# Patient Record
Sex: MALE | Race: WHITE | NOT HISPANIC OR LATINO | URBAN - METROPOLITAN AREA
[De-identification: names, ages, dates, MRNs, and addresses within clinical notes are randomized per-mention and may not be internally consistent; named-entity substitution may affect disease eponyms.]

---

## 2021-04-16 ENCOUNTER — OFFICE VISIT (OUTPATIENT)
Dept: HEPATOLOGY | Facility: CLINIC | Age: 31
End: 2021-04-16
Payer: COMMERCIAL

## 2021-04-16 ENCOUNTER — LAB VISIT (OUTPATIENT)
Dept: LAB | Facility: HOSPITAL | Age: 31
End: 2021-04-16
Payer: COMMERCIAL

## 2021-04-16 ENCOUNTER — PROCEDURE VISIT (OUTPATIENT)
Dept: HEPATOLOGY | Facility: CLINIC | Age: 31
End: 2021-04-16
Payer: COMMERCIAL

## 2021-04-16 ENCOUNTER — PATIENT MESSAGE (OUTPATIENT)
Dept: HEPATOLOGY | Facility: CLINIC | Age: 31
End: 2021-04-16

## 2021-04-16 VITALS
BODY MASS INDEX: 28 KG/M2 | HEIGHT: 70 IN | OXYGEN SATURATION: 99 % | DIASTOLIC BLOOD PRESSURE: 82 MMHG | WEIGHT: 195.56 LBS | RESPIRATION RATE: 16 BRPM | HEART RATE: 72 BPM | SYSTOLIC BLOOD PRESSURE: 142 MMHG

## 2021-04-16 DIAGNOSIS — R79.89 ELEVATED LIVER FUNCTION TESTS: ICD-10-CM

## 2021-04-16 DIAGNOSIS — K76.0 FATTY LIVER: Primary | ICD-10-CM

## 2021-04-16 DIAGNOSIS — K76.0 FATTY LIVER: ICD-10-CM

## 2021-04-16 DIAGNOSIS — K74.01 EARLY HEPATIC FIBROSIS: ICD-10-CM

## 2021-04-16 LAB
ALBUMIN SERPL BCP-MCNC: 3.9 G/DL (ref 3.5–5.2)
ALP SERPL-CCNC: 122 U/L (ref 55–135)
ALT SERPL W/O P-5'-P-CCNC: 75 U/L (ref 10–44)
AST SERPL-CCNC: 28 U/L (ref 10–40)
BILIRUB DIRECT SERPL-MCNC: <0.1 MG/DL (ref 0.1–0.3)
BILIRUB SERPL-MCNC: 0.6 MG/DL (ref 0.1–1)
HEPATITIS A ANTIBODY, IGG: NEGATIVE
PROT SERPL-MCNC: 7.4 G/DL (ref 6–8.4)

## 2021-04-16 PROCEDURE — 91200 FIBROSCAN (VIBRATION CONTROLLED TRANSIENT ELASTOGRAPHY): ICD-10-PCS | Mod: S$GLB,,, | Performed by: NURSE PRACTITIONER

## 2021-04-16 PROCEDURE — 86706 HEP B SURFACE ANTIBODY: CPT | Performed by: NURSE PRACTITIONER

## 2021-04-16 PROCEDURE — 99999 PR PBB SHADOW E&M-NEW PATIENT-LVL III: ICD-10-PCS | Mod: PBBFAC,,, | Performed by: NURSE PRACTITIONER

## 2021-04-16 PROCEDURE — 99204 PR OFFICE/OUTPT VISIT, NEW, LEVL IV, 45-59 MIN: ICD-10-PCS | Mod: S$GLB,,, | Performed by: NURSE PRACTITIONER

## 2021-04-16 PROCEDURE — 91200 LIVER ELASTOGRAPHY: CPT | Mod: S$GLB,,, | Performed by: NURSE PRACTITIONER

## 2021-04-16 PROCEDURE — 36415 COLL VENOUS BLD VENIPUNCTURE: CPT | Performed by: NURSE PRACTITIONER

## 2021-04-16 PROCEDURE — 1126F AMNT PAIN NOTED NONE PRSNT: CPT | Mod: S$GLB,,, | Performed by: NURSE PRACTITIONER

## 2021-04-16 PROCEDURE — 86790 VIRUS ANTIBODY NOS: CPT | Performed by: NURSE PRACTITIONER

## 2021-04-16 PROCEDURE — 99204 OFFICE O/P NEW MOD 45 MIN: CPT | Mod: S$GLB,,, | Performed by: NURSE PRACTITIONER

## 2021-04-16 PROCEDURE — 3008F PR BODY MASS INDEX (BMI) DOCUMENTED: ICD-10-PCS | Mod: CPTII,S$GLB,, | Performed by: NURSE PRACTITIONER

## 2021-04-16 PROCEDURE — 80076 HEPATIC FUNCTION PANEL: CPT | Performed by: NURSE PRACTITIONER

## 2021-04-16 PROCEDURE — 3008F BODY MASS INDEX DOCD: CPT | Mod: CPTII,S$GLB,, | Performed by: NURSE PRACTITIONER

## 2021-04-16 PROCEDURE — 1126F PR PAIN SEVERITY QUANTIFIED, NO PAIN PRESENT: ICD-10-PCS | Mod: S$GLB,,, | Performed by: NURSE PRACTITIONER

## 2021-04-16 PROCEDURE — 99999 PR PBB SHADOW E&M-NEW PATIENT-LVL III: CPT | Mod: PBBFAC,,, | Performed by: NURSE PRACTITIONER

## 2021-04-16 RX ORDER — FENOFIBRATE 160 MG/1
160 TABLET ORAL DAILY
COMMUNITY
Start: 2021-02-24 | End: 2022-06-03

## 2021-04-19 LAB
HBV SURFACE AB SER QL IA: NEGATIVE
HBV SURFACE AB SERPL IA-ACNC: <3 MIU/ML

## 2021-07-21 ENCOUNTER — PATIENT MESSAGE (OUTPATIENT)
Dept: HEPATOLOGY | Facility: CLINIC | Age: 31
End: 2021-07-21

## 2021-11-05 ENCOUNTER — PATIENT MESSAGE (OUTPATIENT)
Dept: HEPATOLOGY | Facility: CLINIC | Age: 31
End: 2021-11-05
Payer: COMMERCIAL

## 2021-11-09 ENCOUNTER — OFFICE VISIT (OUTPATIENT)
Dept: HEPATOLOGY | Facility: CLINIC | Age: 31
End: 2021-11-09
Payer: COMMERCIAL

## 2021-11-09 DIAGNOSIS — R53.1 GENERALIZED WEAKNESS: ICD-10-CM

## 2021-11-09 DIAGNOSIS — K74.01 EARLY HEPATIC FIBROSIS: ICD-10-CM

## 2021-11-09 DIAGNOSIS — R79.89 ELEVATED LIVER FUNCTION TESTS: ICD-10-CM

## 2021-11-09 DIAGNOSIS — R10.11 RIGHT UPPER QUADRANT PAIN: ICD-10-CM

## 2021-11-09 DIAGNOSIS — K76.0 FATTY LIVER: Primary | ICD-10-CM

## 2021-11-09 DIAGNOSIS — R06.02 SHORTNESS OF BREATH: ICD-10-CM

## 2021-11-09 DIAGNOSIS — R55 SYNCOPE, UNSPECIFIED SYNCOPE TYPE: ICD-10-CM

## 2021-11-09 DIAGNOSIS — R53.83 FATIGUE, UNSPECIFIED TYPE: ICD-10-CM

## 2021-11-09 DIAGNOSIS — R68.89 COLD INTOLERANCE: ICD-10-CM

## 2021-11-09 PROCEDURE — 99214 PR OFFICE/OUTPT VISIT, EST, LEVL IV, 30-39 MIN: ICD-10-PCS | Mod: 95,,, | Performed by: NURSE PRACTITIONER

## 2021-11-09 PROCEDURE — 1159F PR MEDICATION LIST DOCUMENTED IN MEDICAL RECORD: ICD-10-PCS | Mod: CPTII,95,, | Performed by: NURSE PRACTITIONER

## 2021-11-09 PROCEDURE — 1160F RVW MEDS BY RX/DR IN RCRD: CPT | Mod: CPTII,95,, | Performed by: NURSE PRACTITIONER

## 2021-11-09 PROCEDURE — 1159F MED LIST DOCD IN RCRD: CPT | Mod: CPTII,95,, | Performed by: NURSE PRACTITIONER

## 2021-11-09 PROCEDURE — 99214 OFFICE O/P EST MOD 30 MIN: CPT | Mod: 95,,, | Performed by: NURSE PRACTITIONER

## 2021-11-09 PROCEDURE — 1160F PR REVIEW ALL MEDS BY PRESCRIBER/CLIN PHARMACIST DOCUMENTED: ICD-10-PCS | Mod: CPTII,95,, | Performed by: NURSE PRACTITIONER

## 2021-11-11 ENCOUNTER — PATIENT MESSAGE (OUTPATIENT)
Dept: HEPATOLOGY | Facility: CLINIC | Age: 31
End: 2021-11-11
Payer: COMMERCIAL

## 2021-11-11 ENCOUNTER — TELEPHONE (OUTPATIENT)
Dept: HEPATOLOGY | Facility: CLINIC | Age: 31
End: 2021-11-11
Payer: COMMERCIAL

## 2021-11-24 ENCOUNTER — PATIENT MESSAGE (OUTPATIENT)
Dept: HEPATOLOGY | Facility: CLINIC | Age: 31
End: 2021-11-24
Payer: COMMERCIAL

## 2021-11-24 ENCOUNTER — TELEPHONE (OUTPATIENT)
Dept: HEPATOLOGY | Facility: CLINIC | Age: 31
End: 2021-11-24
Payer: COMMERCIAL

## 2021-11-24 DIAGNOSIS — R55 SYNCOPE, UNSPECIFIED SYNCOPE TYPE: ICD-10-CM

## 2021-11-24 DIAGNOSIS — R10.11 RIGHT UPPER QUADRANT PAIN: Primary | ICD-10-CM

## 2021-11-24 DIAGNOSIS — R53.83 FATIGUE, UNSPECIFIED TYPE: ICD-10-CM

## 2021-11-24 DIAGNOSIS — K76.0 FATTY LIVER: ICD-10-CM

## 2021-11-24 DIAGNOSIS — R06.02 SHORTNESS OF BREATH: ICD-10-CM

## 2021-11-24 DIAGNOSIS — K74.01 EARLY HEPATIC FIBROSIS: ICD-10-CM

## 2021-11-24 DIAGNOSIS — R53.1 GENERALIZED WEAKNESS: ICD-10-CM

## 2021-11-29 ENCOUNTER — HOSPITAL ENCOUNTER (OUTPATIENT)
Dept: RADIOLOGY | Facility: HOSPITAL | Age: 31
Discharge: HOME | End: 2021-11-29
Attending: NURSE PRACTITIONER
Payer: COMMERCIAL

## 2021-11-29 ENCOUNTER — HOSPITAL ENCOUNTER (OUTPATIENT)
Dept: CARDIOLOGY | Facility: HOSPITAL | Age: 31
Discharge: HOME | End: 2021-11-29
Attending: NURSE PRACTITIONER
Payer: COMMERCIAL

## 2021-11-29 VITALS
WEIGHT: 195 LBS | SYSTOLIC BLOOD PRESSURE: 142 MMHG | BODY MASS INDEX: 27.92 KG/M2 | DIASTOLIC BLOOD PRESSURE: 82 MMHG | HEIGHT: 70 IN

## 2021-11-29 DIAGNOSIS — R55 SYNCOPE, UNSPECIFIED SYNCOPE TYPE: ICD-10-CM

## 2021-11-29 DIAGNOSIS — R06.02 SHORTNESS OF BREATH: ICD-10-CM

## 2021-11-29 DIAGNOSIS — K76.0 FATTY LIVER: ICD-10-CM

## 2021-11-29 DIAGNOSIS — K74.01 EARLY HEPATIC FIBROSIS: ICD-10-CM

## 2021-11-29 DIAGNOSIS — R53.83 FATIGUE, UNSPECIFIED TYPE: ICD-10-CM

## 2021-11-29 DIAGNOSIS — R10.11 RIGHT UPPER QUADRANT PAIN: ICD-10-CM

## 2021-11-29 DIAGNOSIS — R53.1 GENERALIZED WEAKNESS: ICD-10-CM

## 2021-11-29 DIAGNOSIS — R79.89 ELEVATED LIVER FUNCTION TESTS: ICD-10-CM

## 2021-11-29 LAB
AORTIC ROOT ANNULUS: 2.84 CM
AV INDEX (PROSTH): 0.79
AV MEAN GRADIENT: 7 MMHG
AV PEAK GRADIENT: 13 MMHG
AV REGURGITATION PRESSURE HALF TIME: 1219.01 MS
AV VALVE AREA: 2.46 CM2
AV VELOCITY RATIO: 0.77
BSA FOR ECHO PROCEDURE: 2.09 M2
CV ECHO LV RWT: 0.35 CM
DOP CALC AO PEAK VEL: 1.81 M/S
DOP CALC AO VTI: 38.1 CM
DOP CALC LVOT AREA: 3.1 CM2
DOP CALC LVOT DIAMETER: 1.99 CM
DOP CALC LVOT PEAK VEL: 1.39 M/S
DOP CALC LVOT STROKE VOLUME: 93.88 CM3
DOP CALC RVOT PEAK VEL: 0.83 M/S
DOP CALC RVOT VTI: 18.6 CM
DOP CALCLVOT PEAK VEL VTI: 30.2 CM
E WAVE DECELERATION TIME: 159.86 MSEC
E/A RATIO: 1.91
E/E' RATIO: 7.52 M/S
ECHO EF ESTIMATED: 55 %
ECHO LV POSTERIOR WALL: 0.78 CM (ref 0.6–1.1)
EJECTION FRACTION: 60 %
FRACTIONAL SHORTENING: 29 % (ref 28–44)
INTERVENTRICULAR SEPTUM: 1.1 CM (ref 0.6–1.1)
IVRT: 97.05 MSEC
LA MAJOR: 4.59 CM
LA MINOR: 4.34 CM
LA WIDTH: 3.51 CM
LEFT ATRIUM SIZE: 3.07 CM
LEFT ATRIUM VOLUME INDEX MOD: 24.1 ML/M2
LEFT ATRIUM VOLUME INDEX: 19.8 ML/M2
LEFT ATRIUM VOLUME MOD: 49.59 CM3
LEFT ATRIUM VOLUME: 40.86 CM3
LEFT INTERNAL DIMENSION IN SYSTOLE: 3.22 CM (ref 2.1–4)
LEFT VENTRICLE DIASTOLIC VOLUME INDEX: 45.17 ML/M2
LEFT VENTRICLE DIASTOLIC VOLUME: 93.04 ML
LEFT VENTRICLE MASS INDEX: 69 G/M2
LEFT VENTRICLE SYSTOLIC VOLUME INDEX: 20.2 ML/M2
LEFT VENTRICLE SYSTOLIC VOLUME: 41.64 ML
LEFT VENTRICULAR INTERNAL DIMENSION IN DIASTOLE: 4.51 CM (ref 3.5–6)
LEFT VENTRICULAR MASS: 141.36 G
LV LATERAL E/E' RATIO: 6.81 M/S
LV SEPTAL E/E' RATIO: 8.38 M/S
LVOT MG: 3.95 MMHG
LVOT MV: 0.92 CM/S
MV PEAK A VEL: 0.57 M/S
MV PEAK E VEL: 1.09 M/S
PISA AR MAX VEL: 3.3 M/S
PISA TR MAX VEL: 2.67 M/S
PULM VEIN S/D RATIO: 0.84
PV MEAN GRADIENT: 1.63 MMHG
PV MV: 0.87 M/S
PV PEAK D VEL: 0.57 M/S
PV PEAK S VEL: 0.48 M/S
PV PEAK VELOCITY: 1.25 CM/S
RA MAJOR: 4.59 CM
RA PRESSURE: 3 MMHG
RA WIDTH: 2.83 CM
RIGHT VENTRICULAR END-DIASTOLIC DIMENSION: 1.88 CM
SINUS: 2.71 CM
STJ: 2.36 CM
TDI LATERAL: 0.16 M/S
TDI SEPTAL: 0.13 M/S
TDI: 0.15 M/S
TR MAX PG: 29 MMHG
TRICUSPID ANNULAR PLANE SYSTOLIC EXCURSION: 2.24 CM
TV REST PULMONARY ARTERY PRESSURE: 32 MMHG

## 2021-11-29 PROCEDURE — 93306 TTE W/DOPPLER COMPLETE: CPT | Mod: 26,,, | Performed by: INTERNAL MEDICINE

## 2021-11-29 PROCEDURE — 74177 CT ABD & PELVIS W/CONTRAST: CPT | Mod: TC

## 2021-11-29 PROCEDURE — 74177 CT ABDOMEN PELVIS WITH CONTRAST: ICD-10-PCS | Mod: 26,,, | Performed by: RADIOLOGY

## 2021-11-29 PROCEDURE — 25500020 PHARM REV CODE 255: Performed by: NURSE PRACTITIONER

## 2021-11-29 PROCEDURE — 74177 CT ABD & PELVIS W/CONTRAST: CPT | Mod: 26,,, | Performed by: RADIOLOGY

## 2021-11-29 PROCEDURE — 71046 X-RAY EXAM CHEST 2 VIEWS: CPT | Mod: 26,,, | Performed by: RADIOLOGY

## 2021-11-29 PROCEDURE — 71046 XR CHEST PA AND LATERAL: ICD-10-PCS | Mod: 26,,, | Performed by: RADIOLOGY

## 2021-11-29 PROCEDURE — 93306 ECHO (CUPID ONLY): ICD-10-PCS | Mod: 26,,, | Performed by: INTERNAL MEDICINE

## 2021-11-29 PROCEDURE — 93306 TTE W/DOPPLER COMPLETE: CPT

## 2021-11-29 PROCEDURE — A9698 NON-RAD CONTRAST MATERIALNOC: HCPCS | Performed by: NURSE PRACTITIONER

## 2021-11-29 PROCEDURE — 71046 X-RAY EXAM CHEST 2 VIEWS: CPT | Mod: TC

## 2021-11-29 RX ADMIN — IOHEXOL 1000 ML: 12 SOLUTION ORAL at 03:11

## 2021-11-29 RX ADMIN — IOHEXOL 100 ML: 350 INJECTION, SOLUTION INTRAVENOUS at 04:11

## 2021-12-06 ENCOUNTER — PATIENT MESSAGE (OUTPATIENT)
Dept: HEPATOLOGY | Facility: CLINIC | Age: 31
End: 2021-12-06

## 2021-12-06 ENCOUNTER — OFFICE VISIT (OUTPATIENT)
Dept: HEPATOLOGY | Facility: CLINIC | Age: 31
End: 2021-12-06
Payer: COMMERCIAL

## 2021-12-06 DIAGNOSIS — G47.33 OSA (OBSTRUCTIVE SLEEP APNEA): ICD-10-CM

## 2021-12-06 DIAGNOSIS — K74.01 EARLY HEPATIC FIBROSIS: ICD-10-CM

## 2021-12-06 DIAGNOSIS — R74.01 ELEVATED ALANINE AMINOTRANSFERASE (ALT) LEVEL: ICD-10-CM

## 2021-12-06 DIAGNOSIS — K76.0 FATTY LIVER: Primary | ICD-10-CM

## 2021-12-06 PROCEDURE — 99214 PR OFFICE/OUTPT VISIT, EST, LEVL IV, 30-39 MIN: ICD-10-PCS | Mod: 95,,, | Performed by: NURSE PRACTITIONER

## 2021-12-06 PROCEDURE — 99214 OFFICE O/P EST MOD 30 MIN: CPT | Mod: 95,,, | Performed by: NURSE PRACTITIONER

## 2021-12-07 ENCOUNTER — PATIENT MESSAGE (OUTPATIENT)
Dept: HEPATOLOGY | Facility: CLINIC | Age: 31
End: 2021-12-07
Payer: COMMERCIAL

## 2021-12-07 ENCOUNTER — TELEPHONE (OUTPATIENT)
Dept: HEPATOLOGY | Facility: CLINIC | Age: 31
End: 2021-12-07
Payer: COMMERCIAL

## 2022-02-28 ENCOUNTER — TELEPHONE (OUTPATIENT)
Dept: PULMONOLOGY | Facility: CLINIC | Age: 32
End: 2022-02-28
Payer: COMMERCIAL

## 2022-02-28 NOTE — TELEPHONE ENCOUNTER
Left message on patient voicemail, informing him that I'm contacting him in regards to scheduling him a appointment with one of our providers. I also advised patient that if he wants to schedule appointment or if he has any questions or concerns, he may contact the office. Office number has been provided.

## 2022-05-04 ENCOUNTER — PATIENT MESSAGE (OUTPATIENT)
Dept: INFECTIOUS DISEASES | Facility: CLINIC | Age: 32
End: 2022-05-04
Payer: COMMERCIAL

## 2022-05-09 ENCOUNTER — RESEARCH ENCOUNTER (OUTPATIENT)
Dept: RESEARCH | Facility: HOSPITAL | Age: 32
End: 2022-05-09
Payer: COMMERCIAL

## 2022-05-09 ENCOUNTER — PATIENT MESSAGE (OUTPATIENT)
Dept: INFECTIOUS DISEASES | Facility: CLINIC | Age: 32
End: 2022-05-09
Payer: COMMERCIAL

## 2022-05-09 NOTE — PROGRESS NOTES
RESEARCH STUDY CONSENT ENCOUNTER  ORGAN TRANSPLANT  [facility] DON ZARATE    Study Title: Role of Tumor-Induced Immune Tolerance in the Patient Response to Locoregional Therapy: Implications in Assessment Risk of Hepatocellular Carcinoma Recurrence Following Liver Transplantation    IRB #: [id number].B    IRB Approval Date: 6/8/2016    : Cesar Mckeon MD  Sub-investigator: Esequiel Michael, PhD    Patient Number: to be assigned    Patient is scheduled for labs on Betty 3, 2022.     This study is an observational study to evaluate patients receiving transarterial chemoembolization (TACE) or transarterial radioembolization (TARE) therapy to define the link between tumor-elicited peripheral cell populations, and the risk of hepatocellular carcinoma (HCC) recurrence before orthotopic liver transplantation (OLT). [IRB 2016.131.B] Patient is being consented as a control for this study and participation in this study will end after specimen collection. This patient is NOT undergoing TACE or TARE, and DOES NOT have HCC.     Present for discussion: patient Adelso Marrero, consenter Rena Mares, witness Bibi Carey  Is LAR Consenting for Subject: NO    Prior to the Informed Consent (IC) being signed, or any protocol required testing, procedure, or intervention being performed, the following was done or discussed with Adelso Marrero:    Purpose of the Study, Qualifications to Participate: YES  Study Design, Schedule and Procedures: YES  Risks, Benefits, Alternative Treatments, Compensation and Costs: YES  Confidentiality and HIPAA Authorization for Release of Medical Records for the research trial/subject's right/study related injury: YES  Study related contact information: YES  Voluntary Participation and Withdrawal from the research trial at any time: YES  Patient has been offered the opportunity to ask questions regarding the study and all questions were answered satisfactorily: YES  Patient verbalizes  understanding of the study/procedures and agrees to participate: YES  CRC and PI contact information given to patient:YES  Verification the ICF was appropriately completed: YES  Signed copy given to patient: YES  Copy in patient's chart and original uploaded to [facility]: YES    Research staff present during consent: Bibi Ordaz      No study procedures (I.e. specimen collection) were performed before the informed consent was signed.     In accordance with the study protocol, Research Lab orders will be placed and specimens will be collected on Betty 3, 2022 in:  [facility] VNP: NO  [facility]TX: NO  [facility] IM: YES    Rena Mares  Admin Research- Liver Transplant

## 2022-05-16 DIAGNOSIS — Z00.6 RESEARCH STUDY PATIENT: Primary | ICD-10-CM

## 2022-06-03 ENCOUNTER — OFFICE VISIT (OUTPATIENT)
Dept: HEPATOLOGY | Facility: CLINIC | Age: 32
End: 2022-06-03
Payer: COMMERCIAL

## 2022-06-03 ENCOUNTER — LAB VISIT (OUTPATIENT)
Dept: LAB | Facility: HOSPITAL | Age: 32
End: 2022-06-03
Payer: COMMERCIAL

## 2022-06-03 ENCOUNTER — PATIENT MESSAGE (OUTPATIENT)
Dept: HEPATOLOGY | Facility: CLINIC | Age: 32
End: 2022-06-03

## 2022-06-03 ENCOUNTER — PROCEDURE VISIT (OUTPATIENT)
Dept: HEPATOLOGY | Facility: CLINIC | Age: 32
End: 2022-06-03
Payer: COMMERCIAL

## 2022-06-03 VITALS
RESPIRATION RATE: 18 BRPM | DIASTOLIC BLOOD PRESSURE: 89 MMHG | HEIGHT: 70 IN | WEIGHT: 201.5 LBS | TEMPERATURE: 98 F | SYSTOLIC BLOOD PRESSURE: 133 MMHG | BODY MASS INDEX: 28.85 KG/M2 | OXYGEN SATURATION: 98 % | HEART RATE: 67 BPM

## 2022-06-03 DIAGNOSIS — R74.01 ELEVATED ALANINE AMINOTRANSFERASE (ALT) LEVEL: ICD-10-CM

## 2022-06-03 DIAGNOSIS — K76.0 FATTY LIVER: Primary | ICD-10-CM

## 2022-06-03 DIAGNOSIS — K76.0 FATTY LIVER: ICD-10-CM

## 2022-06-03 DIAGNOSIS — K74.01 EARLY HEPATIC FIBROSIS: ICD-10-CM

## 2022-06-03 DIAGNOSIS — Z00.6 RESEARCH STUDY PATIENT: ICD-10-CM

## 2022-06-03 LAB
ALBUMIN SERPL BCP-MCNC: 3.8 G/DL (ref 3.5–5.2)
ALP SERPL-CCNC: 109 U/L (ref 55–135)
ALT SERPL W/O P-5'-P-CCNC: 95 U/L (ref 10–44)
AST SERPL-CCNC: 38 U/L (ref 10–40)
BILIRUB DIRECT SERPL-MCNC: <0.1 MG/DL (ref 0.1–0.3)
BILIRUB SERPL-MCNC: 0.8 MG/DL (ref 0.1–1)
PROT SERPL-MCNC: 6.8 G/DL (ref 6–8.4)

## 2022-06-03 PROCEDURE — 99214 OFFICE O/P EST MOD 30 MIN: CPT | Mod: S$GLB,,, | Performed by: NURSE PRACTITIONER

## 2022-06-03 PROCEDURE — 3079F PR MOST RECENT DIASTOLIC BLOOD PRESSURE 80-89 MM HG: ICD-10-PCS | Mod: CPTII,S$GLB,, | Performed by: NURSE PRACTITIONER

## 2022-06-03 PROCEDURE — 99214 PR OFFICE/OUTPT VISIT, EST, LEVL IV, 30-39 MIN: ICD-10-PCS | Mod: S$GLB,,, | Performed by: NURSE PRACTITIONER

## 2022-06-03 PROCEDURE — 3008F BODY MASS INDEX DOCD: CPT | Mod: CPTII,S$GLB,, | Performed by: NURSE PRACTITIONER

## 2022-06-03 PROCEDURE — 91200 FIBROSCAN (VIBRATION CONTROLLED TRANSIENT ELASTOGRAPHY): ICD-10-PCS | Mod: S$GLB,,, | Performed by: NURSE PRACTITIONER

## 2022-06-03 PROCEDURE — 91200 LIVER ELASTOGRAPHY: CPT | Mod: S$GLB,,, | Performed by: NURSE PRACTITIONER

## 2022-06-03 PROCEDURE — 3075F SYST BP GE 130 - 139MM HG: CPT | Mod: CPTII,S$GLB,, | Performed by: NURSE PRACTITIONER

## 2022-06-03 PROCEDURE — 3075F PR MOST RECENT SYSTOLIC BLOOD PRESS GE 130-139MM HG: ICD-10-PCS | Mod: CPTII,S$GLB,, | Performed by: NURSE PRACTITIONER

## 2022-06-03 PROCEDURE — 3079F DIAST BP 80-89 MM HG: CPT | Mod: CPTII,S$GLB,, | Performed by: NURSE PRACTITIONER

## 2022-06-03 PROCEDURE — 36415 COLL VENOUS BLD VENIPUNCTURE: CPT | Performed by: NURSE PRACTITIONER

## 2022-06-03 PROCEDURE — 99999 PR PBB SHADOW E&M-EST. PATIENT-LVL IV: CPT | Mod: PBBFAC,,, | Performed by: NURSE PRACTITIONER

## 2022-06-03 PROCEDURE — 3008F PR BODY MASS INDEX (BMI) DOCUMENTED: ICD-10-PCS | Mod: CPTII,S$GLB,, | Performed by: NURSE PRACTITIONER

## 2022-06-03 PROCEDURE — 80076 HEPATIC FUNCTION PANEL: CPT | Performed by: NURSE PRACTITIONER

## 2022-06-03 PROCEDURE — 99999 PR PBB SHADOW E&M-EST. PATIENT-LVL IV: ICD-10-PCS | Mod: PBBFAC,,, | Performed by: NURSE PRACTITIONER

## 2022-06-03 NOTE — PROCEDURES
FibroScan (Vibration Controlled Transient Elastography)    Date/Time: 6/3/2022 11:45 AM  Performed by: Beverly Paul NP  Authorized by: Beverly Paul NP     Diagnosis:  NAFLD    Probe:  M    Universal Protocol: Patient's identity, procedure and site were verified, confirmatory pause was performed.  Discussed procedure including risks and potential complications.  Questions answered.  Patient verbalizes understanding and wishes to proceed with VCTE.     Procedure: After providing explanations of the procedure, patient was placed in the supine position with right arm in maximum abduction to allow optimal exposure of right lateral abdomen.  Patient was briefly assessed, Testing was performed in the mid-axillary location, 50Hz Shear Wave pulses were applied and the resulting Shear Wave and Propagation Speed detected with a 3.5 MHz ultrasonic signal, using the FibroScan probe, Skin to liver capsule distance and liver parenchyma were accessed during the entire examination with the FibroScan probe, Patient was instructed to breathe normally and to abstain from sudden movements during the procedure, allowing for random measurements of liver stiffness. At least 10 Shear Waves were produced, Individual measurements of each Shear Wave were calculated.  Patient tolerated the procedure well with no complications.  Meets discharge criteria as was dismissed.  Rates pain 0 out of 10.  Patient will follow up with ordering provider to review results.      Findings  Median liver stiffness score:  9.1  CAP Reading: dB/m:  340    IQR/med %:  7  Interpretation  Fibrosis interpretation is based on medial liver stiffness - Kilopascal (kPa).    Fibrosis Stage:  F2  Steatosis interpretation is based on controlled attenuation parameter - (dB/m).    Steatosis Grade:  S3

## 2022-06-03 NOTE — PATIENT INSTRUCTIONS
1. Fibroscan today to assess for fat and scar tissue in the liver  2. Recommend an Ultrasound of the liver in 6 months.  3. Repeat liver function tests in 6 months.   4. Avoid herbal supplements/alternative remedies.  5. Recommend a referral to a nutritionist. Please schedule an appointment with your friend to discuss dietary changes.  6. Return to clinic in 6 months.    There is no FDA approved therapy for non-alcoholic fatty liver disease. Therefore, these things are important:  1. Weight loss goal of 15-20 lbs.  2. Low carb/sugar, high fiber and protein diet.Try to limit your carb intake to LESS than 30-45 grams of carbs with a meal or LESS than 5-10 grams with any snack (total of any snack foods eaten during that time). Use MyFitness Pal arleen to add up your carbs through the day. Do NOT drink any beverages with calories or carbs (this can lead to high blood sugar and weight gain). Also, some of our patients have been very successful with weight loss using the pre made/planned meal planning services that limit calories and portion size (one example is Sensible Meals but there are many out there).  3. Exercise, 5 days per week, 30 minutes per day, as tolerated.  4. Recommend good control of cholesterol, blood pressure, & blood sugar levels.    In some people, fatty liver can progress to steatohepatitis (inflamatory fatty liver) and possibly to cirrhosis, putting one at increased risk for liver cancer, liver failure, and death. Therefore, the lifestyle changes are very important to decrease this risk.     Website with information about fatty liver and inflammation related to fatty liver (ISIDRO) = www.nashtruth.com  AND www.NASHactually.com

## 2022-06-03 NOTE — PROGRESS NOTES
"[facility] VISIT ESTABLISHED PT NOTE    REFERRING PROVIDER:  No ref. provider found    CHIEF COMPLAINT: Fatty Liver    HPI: This is a 31 y.o. White male with PMH noted below, presenting for follow up. He was last seen in clinic by myself in 12/2021. He is accompanied by his wife. He underwent abdominal ultrasound on 2/19/2021 which showed increased echogenicity, and decreased transmission of the liver suggestive of steatosis, and suspected focal fatty sparing, near the gallbladder fossa. He subsequently underwent MRI for further evaluation, which showed hepatic steatosis with small foci of focal fatty sparing near the gallbladder fossa.     The patient's risk factors for NAFLD/ISIDRO include:     · Obesity                                        No, but overweight; BMI 28.91  · Dyslipidemia                                Yes; Previously on Fenofibrate, but did not tolerate, was briefly on Bempodic Aci and Ezetimibe but stopped, plans to refill soon.  · Insulin resistance/Diabetes         No   · Family history of diabetes           Yes    He is not immune to Hepatitis A or B. He denies any known family history of liver disease. He does have a family history of diabetes. He denies any history of heavy alcohol intake or IVDU. Fibroscan to stage his liver disease in April 2021 was suggestive of significant steatosis, with F0-F1 fibrosis, and a low likelihood of cirrhosis. Late last year, he complained of intermittent RUQ pain, severe fatigue, cold intolerance, generalized weakness, intermittent shortness of breath, dark stools, and intermittent right arm numbness and tingling. He also had a near syncopal episode with "yellowing of his eyes" at [employer]. He saw his PCP, who recommended that he follow up with Hepatology for further evaluation of his symptoms.  Work up showed normal blood counts and renal function tests. LFT's showed no significant changes with an ALT of 81, AST of 32, ALP of 107, with normal " "synthetic function. Iron studies showed a low iron saturation of 18%; ferritin was mildly elevated at 314, likely secondary to fatty liver disease. Acute viral hepatitis panel was negative. TSH and AFP normal. HgbA1c was 5.3%. CXR was negative. CT abdomen/pelvis showed "mild patchy tree-in-bud opacities within the left lower lobe likely representing changes of aspiration or a nonspecific acute infectious/inflammatory process" and no acute abnormality within the abdomen or pelvis. Echocardiogram showed a normal EF of 60%; pulmonary artery systolic pressure was elevated at 32 mmHg.   His symptoms have improved, but states that he still has episodes of "looking jaundiced". Repeat Fibroscan today is again suggestive of severe fatty infiltration of the liver, but with F2 (moderate) fibrosis. LFT's today show an ALT of 95, AST of 38, with normal synthetic function. He has gained 3 kgs since last visit, but he was weighed with keys/cell phone in pocket. He is well appearing, and has no has no s/s of advanced liver disease including jaundice, dark urine, pruritus, abdominal distention, lower extremity edema, hematemesis, melena, or periods of confusion suggestive of hepatic encephalopathy.    Review of patient's allergies indicates:  No Known Allergies    Current Outpatient Medications on File Prior to Visit   Medication Sig Dispense Refill    [DISCONTINUED] fenofibrate 160 MG Tab Take 160 mg by mouth once daily.       No current facility-administered medications on file prior to visit.     PMHX:  has a past medical history of Body mass index (BMI) 28.0-28.9, adult, Dyslipidemia, Elevated liver enzymes, Fatty liver, GUSTAVO (obstructive sleep apnea), and Santosh's nails.    PSHX:  has a past surgical history that includes Anterior cruciate ligament repair (Left, 2012) and Knee arthroscopy w/ lateral release (Left, 2016).    FAMILY HISTORY: Negative for liver disease, reviewed in [facility]    SOCIAL HISTORY:   Social History " "    Tobacco Use   Smoking Status Former Smoker    Quit date: 2008    Years since quittin.1   Smokeless Tobacco Not on file     Social History     Substance and Sexual Activity   Alcohol Use Not Currently     Social History     Substance and Sexual Activity   Drug Use Not Currently    Types: Marijuana     ROS:   GENERAL: Denies fever, chills, weight loss/gain, fatigue  HEENT: Denies headaches, dizziness, vision/hearing changes  CARDIOVASCULAR: Denies chest pain, palpitations, or edema  RESPIRATORY: Denies dyspnea, cough  GI: Denies abdominal pain, rectal bleeding, nausea, vomiting. No change in bowel pattern or color  : Denies dysuria, hematuria   SKIN: Denies rash, itching   NEURO: Denies confusion, memory loss, or mood changes  PSYCH: Denies depression or anxiety  HEME/LYMPH: Denies easy bruising or bleeding    PHYSICAL EXAM:   Friendly White male, in no acute distress; alert and oriented to person, place and time.  VITALS: /89 (BP Location: Right arm, Patient Position: Sitting, BP Method: Medium (Automatic))   Pulse 67   Temp 98.4 °F (36.9 °C) (Oral)   Resp 18   Ht 5' 10" (1.778 m)   Wt 91.4 kg (201 lb 8 oz)   SpO2 98%   BMI 28.91 kg/m²  (Not done - Telehealth visit)  HENT: Normocephalic, without obvious abnormality.   EYES: Sclerae anicteric.   NECK: No obvious masses.  CARDIOVASCULAR: No peripheral edema.  RESPIRATORY: Normal respiratory effort.   GI: Soft, obese abdomen.  EXTREMITIES:  No clubbing, cyanosis or edema.  SKIN: Warm and dry. No jaundice. No rashes noted to exposed skin.  NEURO:  Normal gait. No asterixis.  PSYCH:  Memory intact. Thought and speech pattern appropriate. Behavior normal. No depression or anxiety noted.    DIAGNOSTIC STUDIES:    OUTSIDE LABS 2021:        ABD. U/S 2021:         OUTSIDE LABS 2021:            MRI ABDOMEN W/W/O CONTRAST 3/4/2021:         FIBROSCAN 2021:     Findings  Median liver stiffness score:  6.9  CAP Reading: dB/m:  " 357     IQR/med %:  16  Interpretation  Fibrosis interpretation is based on medial liver stiffness - Kilopascal (kPa).     Fibrosis Stage:  F 0-1  Steatosis interpretation is based on controlled attenuation parameter - (dB/m).     Steatosis Grade:  S3    CT Abdomen Pelvis With Contrast  Narrative: EXAMINATION:  CT ABDOMEN PELVIS WITH CONTRAST    CLINICAL HISTORY:  RUQ pain, fatigue and syncope, h/o fatty liver.; Fatty (change of) liver, not elsewhere classified    TECHNIQUE:  Low dose axial images, sagittal and coronal reformations were obtained of the abdomen and pelvis following the IV administration of 100 mL of Omnipaque 350 and the oral administration of 30 mL of Omnipaque 350.    COMPARISON:  None.    FINDINGS:  There are mild patchy tree-in-bud opacities within the left lower lobe likely representing changes of aspiration or a nonspecific acute infectious/inflammatory process.  Heart is normal in size.  No pericardial effusion.    Liver demonstrates unremarkable appearance without focal lesion.  Spleen, pancreas, and gallbladder appear normal.  No intrahepatic or extrahepatic biliary ductal dilatation.  The hepatic, portal, and superior mesenteric veins are patent.  Adrenal glands are normal.    Kidneys are normal without hydronephrosis or renal stone.  Bladder is normal.  There are a few calcified phleboliths within the pelvis.    Bowel is normal in course and caliber without focal thickening or obstruction.  Terminal ileum is normal.  Appendix is normal.  Stomach and duodenum are normal.    No free fluid or air within the abdomen and pelvis.  No lymphadenopathy.  Abdominal aorta is nonaneurysmal.  Branch vessels of the abdominal aorta are patent.  No stranding within the abdomen and pelvis to indicate a site of acute pain.    No suspicious lytic or blastic osseous lesion.  No fracture.  Impression: 1.  No acute abnormality within the abdomen and pelvis to explain patient's reported pain.    2.  Mild patchy  "tree-in-bud opacities within the left lower lobe likely representing changes of aspiration or a nonspecific acute infectious/inflammatory process.    Electronically signed by: Everett Link  Date:    11/29/2021  Time:    16:58  Echo  · The left ventricle is normal in size with normal systolic function.  · The estimated ejection fraction is 60%.  · Normal left ventricular diastolic function.  · Normal right ventricular size with normal right ventricular systolic   function.  · Mild tricuspid regurgitation.  · Normal central venous pressure (3 mmHg).  · The estimated PA systolic pressure is 32 mmHg.     X-Ray Chest PA And Lateral  Narrative: EXAMINATION:  XR CHEST PA AND LATERAL    CLINICAL HISTORY:  Fatty (change of) liver, not elsewhere classified    TECHNIQUE:  PA and lateral views of the chest were performed.    COMPARISON:  None    FINDINGS:  The lungs are clear and free of infiltrate.  No pleural effusion or pneumothorax. The heart is not enlarged.  Impression: 1.  No acute cardiopulmonary process.    Electronically signed by: Juancarlos Elizondo DO  Date:    11/29/2021  Time:    16:10    Echocardiogram 11/29/2021:    Summary    · The left ventricle is normal in size with normal systolic function.  · The estimated ejection fraction is 60%.  · Normal left ventricular diastolic function.  · Normal right ventricular size with normal right ventricular systolic function.  · Mild tricuspid regurgitation.  · Normal central venous pressure (3 mmHg).  · The estimated PA systolic pressure is 32 mmHg.       Vitals    Height Weight BMI (Calculated) BSA (Calculated - sq m) BP Pulse   5' 10" (1.778 m) 88.5 kg (195 lb) 28 2.09 sq meters 142/82      Study Details    A complete echo was performed using complete 2D, color flow Doppler and spectral Doppler. During the study, the apical, parasternal, subcostal and suprasternal views were captured.  Overall the study quality was excellent.     Echocardiography Findings      Left " Ventricle    The left ventricle is normal in size with normal systolic function. The estimated ejection fraction is 60%. The wall thickness is normal. There is normal diastolic function. There is normal wall motion.     Right Ventricle    Normal cavity size, wall thickness and systolic function. Wall motion normal.     Left Atrium    The left atrial volume index is normal. Left atrial volume index is 19.8 mL/m2.     Right Atrium    The right atrium is normal in size.     Aortic Valve    The aortic valve appears structurally normal. The valve is trileaflet. There is normal leaflet mobility. No regurgitation. There is no stenosis.     Mitral Valve    The mitral valve appears structurally normal. There is normal leaflet mobility.  There is no mitral valve regurgitation.     Tricuspid Valve    The tricuspid valve appears structurally normal. There is normal leaflet mobility. There is mild regurgitation. The estimated PA systolic pressure is greater than 29 mmHg.     Pulmonic Valve    Pulmonic valve is structurally normal. There is normal leaflet mobility. No regurgitation.     IVC/SVC    Normal central venous pressure (3 mm Hg).     Ascending Aorta    The aortic root and ascending aorta are normal in size.     Pericardium and Other Findings    Pericardium is normal. There is no pericardial effusion.     FIBROSCAN 6/3/2022:    Findings  Median liver stiffness score:  9.1  CAP Reading: dB/m:  340     IQR/med %:  7  Interpretation  Fibrosis interpretation is based on medial liver stiffness - Kilopascal (kPa).     Fibrosis Stage:  F2  Steatosis interpretation is based on controlled attenuation parameter - (dB/m).     Steatosis Grade:  S3     ASSESSMENT & PLAN:  31 y.o. White male with:    1. Fatty liver  Hepatic Function Panel    FibroScan (Vibration Controlled Transient Elastography)    US Abdomen Complete   2. Early hepatic fibrosis  Hepatic Function Panel    FibroScan (Vibration Controlled Transient Elastography)    US  Abdomen Complete   3. Elevated alanine aminotransferase (ALT) level  Hepatic Function Panel    FibroScan (Vibration Controlled Transient Elastography)    US Abdomen Complete     - Repeat Fibroscan and liver function tests in 6 months.  - Recommend abdominal imaging annually, next due 11/2022.  - Continue to work on dietary changes, increased aerobic exercise, and weight loss (aim for 15-20 pound weight loss).  - Avoid alcohol and herbal supplements/alternative remedies.    Follow up in about 6 months (around 12/3/2022). with repeat labs, US and Fibroscan same day as visit.      Thank you for allowing me to participate in the care of Adelso Marrero       Hepatology Nurse Practitioner  [facility]  6/3/2022 @ [id number]    CC'ed note to:   No ref. provider found  Douglas Haji NP

## 2022-12-09 DIAGNOSIS — Z00.6 RESEARCH STUDY PATIENT: Primary | ICD-10-CM
